# Patient Record
Sex: MALE | Race: WHITE
[De-identification: names, ages, dates, MRNs, and addresses within clinical notes are randomized per-mention and may not be internally consistent; named-entity substitution may affect disease eponyms.]

---

## 2020-06-03 ENCOUNTER — HOSPITAL ENCOUNTER (EMERGENCY)
Dept: HOSPITAL 47 - EC | Age: 65
Discharge: HOME | End: 2020-06-03
Payer: MEDICARE

## 2020-06-03 VITALS — RESPIRATION RATE: 16 BRPM | DIASTOLIC BLOOD PRESSURE: 70 MMHG | SYSTOLIC BLOOD PRESSURE: 139 MMHG | HEART RATE: 104 BPM

## 2020-06-03 VITALS — TEMPERATURE: 98.3 F

## 2020-06-03 DIAGNOSIS — R55: Primary | ICD-10-CM

## 2020-06-03 DIAGNOSIS — F17.200: ICD-10-CM

## 2020-06-03 LAB
ALBUMIN SERPL-MCNC: 4.5 G/DL (ref 3.5–5)
ALP SERPL-CCNC: 77 U/L (ref 38–126)
ALT SERPL-CCNC: 14 U/L (ref 4–49)
ANION GAP SERPL CALC-SCNC: 14 MMOL/L
APTT BLD: 20 SEC (ref 22–30)
AST SERPL-CCNC: 22 U/L (ref 17–59)
BASOPHILS # BLD AUTO: 0 K/UL (ref 0–0.2)
BASOPHILS NFR BLD AUTO: 0 %
BUN SERPL-SCNC: 20 MG/DL (ref 9–20)
CALCIUM SPEC-MCNC: 9.5 MG/DL (ref 8.4–10.2)
CHLORIDE SERPL-SCNC: 100 MMOL/L (ref 98–107)
CO2 SERPL-SCNC: 20 MMOL/L (ref 22–30)
EOSINOPHIL # BLD AUTO: 0.1 K/UL (ref 0–0.7)
EOSINOPHIL NFR BLD AUTO: 1 %
ERYTHROCYTE [DISTWIDTH] IN BLOOD BY AUTOMATED COUNT: 4.59 M/UL (ref 4.3–5.9)
ERYTHROCYTE [DISTWIDTH] IN BLOOD: 12.6 % (ref 11.5–15.5)
GLUCOSE SERPL-MCNC: 165 MG/DL (ref 74–99)
HCT VFR BLD AUTO: 45.2 % (ref 39–53)
HGB BLD-MCNC: 15.5 GM/DL (ref 13–17.5)
INR PPP: 0.9 (ref ?–1.2)
LYMPHOCYTES # SPEC AUTO: 2.2 K/UL (ref 1–4.8)
LYMPHOCYTES NFR SPEC AUTO: 18 %
MCH RBC QN AUTO: 33.9 PG (ref 25–35)
MCHC RBC AUTO-ENTMCNC: 34.3 G/DL (ref 31–37)
MCV RBC AUTO: 98.6 FL (ref 80–100)
MONOCYTES # BLD AUTO: 0.6 K/UL (ref 0–1)
MONOCYTES NFR BLD AUTO: 5 %
NEUTROPHILS # BLD AUTO: 8.9 K/UL (ref 1.3–7.7)
NEUTROPHILS NFR BLD AUTO: 74 %
PLATELET # BLD AUTO: 319 K/UL (ref 150–450)
POTASSIUM SERPL-SCNC: 4.2 MMOL/L (ref 3.5–5.1)
PROT SERPL-MCNC: 7.2 G/DL (ref 6.3–8.2)
PT BLD: 9.5 SEC (ref 9–12)
SODIUM SERPL-SCNC: 134 MMOL/L (ref 137–145)
WBC # BLD AUTO: 12 K/UL (ref 3.8–10.6)

## 2020-06-03 PROCEDURE — 99285 EMERGENCY DEPT VISIT HI MDM: CPT

## 2020-06-03 PROCEDURE — 85610 PROTHROMBIN TIME: CPT

## 2020-06-03 PROCEDURE — 93005 ELECTROCARDIOGRAM TRACING: CPT

## 2020-06-03 PROCEDURE — 84484 ASSAY OF TROPONIN QUANT: CPT

## 2020-06-03 PROCEDURE — 36415 COLL VENOUS BLD VENIPUNCTURE: CPT

## 2020-06-03 PROCEDURE — 80053 COMPREHEN METABOLIC PANEL: CPT

## 2020-06-03 PROCEDURE — 85730 THROMBOPLASTIN TIME PARTIAL: CPT

## 2020-06-03 PROCEDURE — 71045 X-RAY EXAM CHEST 1 VIEW: CPT

## 2020-06-03 PROCEDURE — 85025 COMPLETE CBC W/AUTO DIFF WBC: CPT

## 2020-06-03 NOTE — ED
General Adult HPI





- General


Chief complaint: Syncope


Stated complaint: Syncope


Time Seen by Provider: 06/03/20 05:01


Source: patient, EMS


Mode of arrival: EMS


Limitations: no limitations





- History of Present Illness


Initial comments: 





This patient is a 64-year-old man who presents to be evaluated for syncope.  The

patient relates that he had been resting tonight when he heard something fall.  

The patient went to go and check and found his brother lying on the floor.  He 

states that he was able to arouse his brother, but that he was not able to stand

so he went to phone EMS.  After EMS arrived and began to assess his brother the 

patient states that he was standing there and began to feel a bit lightheaded.  

He went to sit down and was feeling better.  When he went to stand up again he 

felt lightheaded and then came to lying on the floor.  He denied chest pain or 

dyspnea.  No palpitations or diaphoresis.  Patient states that he currently 

feels back at his baseline.  He has not believe he sustained any injury.


-: minutes(s)


Severity scale (1-10): 0


Consistency: now resolved


Improves with: none


Worsens with: none


Associated Symptoms: denies other symptoms


Treatments Prior to Arrival: none





- Related Data


                                    Allergies











Allergy/AdvReac Type Severity Reaction Status Date / Time


 


No Known Allergies Allergy   Verified 06/03/20 05:23














Review of Systems


ROS Statement: 


Those systems with pertinent positive or pertinent negative responses have been 

documented in the HPI.





ROS Other: All systems not noted in ROS Statement are negative.


Constitutional: Denies: fever, chills


Eyes: Denies: vision change


Respiratory: Denies: cough, dyspnea


Cardiovascular: Reports: syncope.  Denies: chest pain, palpitations, dyspnea on 

exertion, edema


Gastrointestinal: Denies: abdominal pain, vomiting, diarrhea, melena, 

hematochezia


Genitourinary: Denies: dysuria, hematuria


Musculoskeletal: Denies: back pain


Skin: Denies: rash


Neurological: Denies: headache, weakness, numbness, paresthesias





Past Medical History


Past Medical History: Hypertension


History of Any Multi-Drug Resistant Organisms: None Reported


Past Surgical History: No Surgical Hx Reported


Past Psychological History: No Psychological Hx Reported


Smoking Status: Current every day smoker


Past Alcohol Use History: Occasional


Past Drug Use History: Marijuana





General Exam


Limitations: no limitations


General appearance: alert, in no apparent distress


Head exam: Present: atraumatic, normocephalic


Eye exam: Present: normal appearance, PERRL, EOMI.  Absent: scleral icterus, 

conjunctival injection, nystagmus


ENT exam: Present: normal oropharynx


Neck exam: Present: normal inspection, full ROM.  Absent: tenderness


Respiratory exam: Present: normal lung sounds bilaterally.  Absent: respiratory 

distress, wheezes, rales, rhonchi, stridor, chest wall tenderness


Cardiovascular Exam: Present: regular rate, normal rhythm, normal heart sounds. 

Absent: systolic murmur, diastolic murmur, rubs, gallop


GI/Abdominal exam: Present: soft.  Absent: distended, tenderness, guarding, 

rebound, rigid, mass


Extremities exam: Present: normal inspection, normal capillary refill.  Absent: 

pedal edema, calf tenderness


Back exam: Present: normal inspection.  Absent: CVA tenderness (R), CVA 

tenderness (L), paraspinal tenderness, vertebral tenderness


Neurological exam: Present: alert, oriented X3, CN II-XII intact.  Absent: motor

sensory deficit


Skin exam: Present: warm, dry, intact, normal color.  Absent: rash





Course


                                   Vital Signs











  06/03/20 06/03/20 06/03/20





  04:50 05:00 05:30


 


Temperature 98.3 F  


 


Pulse Rate 123 H 114 H 111 H


 


Respiratory 18 16 16





Rate   


 


Blood Pressure 141/72 146/85 138/67


 


O2 Sat by Pulse 99 99 100





Oximetry   














  06/03/20





  06:00


 


Temperature 


 


Pulse Rate 104 H


 


Respiratory 16





Rate 


 


Blood Pressure 139/70


 


O2 Sat by Pulse 98





Oximetry 














EKG Findings





- EKG Results:


EKG: interpreted by ERMD, sinus rhythm, normal QRS, normal ST/T


EKG shows: tachycardia (Rate 119 bpm)





- Blocks, Axis, Hypertrophy, ST Abn:


QRS axis and voltage: left axis deviation (-30 to -90)





Medical Decision Making





- Lab Data


Result diagrams: 


                                 06/03/20 04:51





                                 06/03/20 04:51


                                   Lab Results











  06/03/20 06/03/20 06/03/20 Range/Units





  04:51 04:51 04:51 


 


WBC  12.0 H    (3.8-10.6)  k/uL


 


RBC  4.59    (4.30-5.90)  m/uL


 


Hgb  15.5    (13.0-17.5)  gm/dL


 


Hct  45.2    (39.0-53.0)  %


 


MCV  98.6    (80.0-100.0)  fL


 


MCH  33.9    (25.0-35.0)  pg


 


MCHC  34.3    (31.0-37.0)  g/dL


 


RDW  12.6    (11.5-15.5)  %


 


Plt Count  319    (150-450)  k/uL


 


Neutrophils %  74    %


 


Lymphocytes %  18    %


 


Monocytes %  5    %


 


Eosinophils %  1    %


 


Basophils %  0    %


 


Neutrophils #  8.9 H    (1.3-7.7)  k/uL


 


Lymphocytes #  2.2    (1.0-4.8)  k/uL


 


Monocytes #  0.6    (0-1.0)  k/uL


 


Eosinophils #  0.1    (0-0.7)  k/uL


 


Basophils #  0.0    (0-0.2)  k/uL


 


PT   9.5   (9.0-12.0)  sec


 


INR   0.9   (<1.2)  


 


APTT   20.0 L   (22.0-30.0)  sec


 


Sodium    134 L  (137-145)  mmol/L


 


Potassium    4.2  (3.5-5.1)  mmol/L


 


Chloride    100  ()  mmol/L


 


Carbon Dioxide    20 L  (22-30)  mmol/L


 


Anion Gap    14  mmol/L


 


BUN    20  (9-20)  mg/dL


 


Creatinine    1.53 H  (0.66-1.25)  mg/dL


 


Est GFR (CKD-EPI)AfAm    54  (>60 ml/min/1.73 sqM)  


 


Est GFR (CKD-EPI)NonAf    47  (>60 ml/min/1.73 sqM)  


 


Glucose    165 H  (74-99)  mg/dL


 


Calcium    9.5  (8.4-10.2)  mg/dL


 


Total Bilirubin    0.5  (0.2-1.3)  mg/dL


 


AST    22  (17-59)  U/L


 


ALT    14  (4-49)  U/L


 


Alkaline Phosphatase    77  ()  U/L


 


Troponin I     (0.000-0.034)  ng/mL


 


Total Protein    7.2  (6.3-8.2)  g/dL


 


Albumin    4.5  (3.5-5.0)  g/dL














  06/03/20 Range/Units





  04:51 


 


WBC   (3.8-10.6)  k/uL


 


RBC   (4.30-5.90)  m/uL


 


Hgb   (13.0-17.5)  gm/dL


 


Hct   (39.0-53.0)  %


 


MCV   (80.0-100.0)  fL


 


MCH   (25.0-35.0)  pg


 


MCHC   (31.0-37.0)  g/dL


 


RDW   (11.5-15.5)  %


 


Plt Count   (150-450)  k/uL


 


Neutrophils %   %


 


Lymphocytes %   %


 


Monocytes %   %


 


Eosinophils %   %


 


Basophils %   %


 


Neutrophils #   (1.3-7.7)  k/uL


 


Lymphocytes #   (1.0-4.8)  k/uL


 


Monocytes #   (0-1.0)  k/uL


 


Eosinophils #   (0-0.7)  k/uL


 


Basophils #   (0-0.2)  k/uL


 


PT   (9.0-12.0)  sec


 


INR   (<1.2)  


 


APTT   (22.0-30.0)  sec


 


Sodium   (137-145)  mmol/L


 


Potassium   (3.5-5.1)  mmol/L


 


Chloride   ()  mmol/L


 


Carbon Dioxide   (22-30)  mmol/L


 


Anion Gap   mmol/L


 


BUN   (9-20)  mg/dL


 


Creatinine   (0.66-1.25)  mg/dL


 


Est GFR (CKD-EPI)AfAm   (>60 ml/min/1.73 sqM)  


 


Est GFR (CKD-EPI)NonAf   (>60 ml/min/1.73 sqM)  


 


Glucose   (74-99)  mg/dL


 


Calcium   (8.4-10.2)  mg/dL


 


Total Bilirubin   (0.2-1.3)  mg/dL


 


AST   (17-59)  U/L


 


ALT   (4-49)  U/L


 


Alkaline Phosphatase   ()  U/L


 


Troponin I  <0.012  (0.000-0.034)  ng/mL


 


Total Protein   (6.3-8.2)  g/dL


 


Albumin   (3.5-5.0)  g/dL














Disposition


Clinical Impression: 


 Syncope





Disposition: HOME SELF-CARE


Condition: Good


Instructions (If sedation given, give patient instructions):  Syncope (DC)


Is patient prescribed a controlled substance at d/c from ED?: No


Referrals: 


Jesus Manuel Dowell DO [Primary Care Provider] - 1-2 days

## 2020-06-03 NOTE — XR
EXAMINATION TYPE: XR chest 1V portable

 

DATE OF EXAM: 6/3/2020

 

COMPARISON: NONE

 

HISTORY: Syncope

 

TECHNIQUE:

 

FINDINGS: There is no heart failure nor confluent pneumonic infiltrate. Costophrenic angles are clear
. There are chest leads. Bony thorax is intact.

 

IMPRESSION: No active cardiopulmonary disease. Normal heart.

## 2020-07-03 ENCOUNTER — HOSPITAL ENCOUNTER (OUTPATIENT)
Dept: HOSPITAL 47 - RADCTMAIN | Age: 65
Discharge: HOME | End: 2020-07-03
Attending: FAMILY MEDICINE
Payer: MEDICARE

## 2020-07-03 DIAGNOSIS — R55: ICD-10-CM

## 2020-07-03 DIAGNOSIS — J34.2: Primary | ICD-10-CM

## 2020-07-03 LAB — BUN SERPL-SCNC: 19 MG/DL (ref 9–20)

## 2020-07-03 PROCEDURE — 82565 ASSAY OF CREATININE: CPT

## 2020-07-03 PROCEDURE — 70470 CT HEAD/BRAIN W/O & W/DYE: CPT

## 2020-07-03 PROCEDURE — 84520 ASSAY OF UREA NITROGEN: CPT

## 2020-07-03 PROCEDURE — 36415 COLL VENOUS BLD VENIPUNCTURE: CPT

## 2020-07-03 NOTE — CT
EXAMINATION TYPE: CT brain wo/w con

 

DATE OF EXAM: 7/3/2020

 

COMPARISON: None

 

HISTORY: 65-year-old male Syncopal episode.

 

TECHNIQUE:  Examination was done in axial plane before and after administration of 80 mL Isovue 300 i
ntravenous contrast.  Coronal and sagittal reconstructions performed.

 

CT DLP: 2193 mGycm

Automated exposure control for dose reduction was used.

 

FINDINGS:

There is no evidence of  acute intracranial hemorrhage, acute ischemic changes, mass, mass-effect, or
 extra-axial fluid collection.  There is no effacement of cerebral sulci or basal subarachnoid cister
ns.  There is no hydrocephalus.  There is no midline shift.  Gray-white matter distinction is preserv
ed.

 

No enhancing intracranial lesions after contrast administration. Dural venous sinuses are patent.

 

Leftward nasal septal deviation. Visualized paranasal sinuses and mastoid air cells are well pneumati
zed. Visualized orbits and globes are intact.

 

 

IMPRESSION:

No intracranial abnormality seen. No enhancing lesions. Leftward nasal septal deviation.